# Patient Record
Sex: MALE | Race: OTHER | Employment: FULL TIME | URBAN - NONMETROPOLITAN AREA
[De-identification: names, ages, dates, MRNs, and addresses within clinical notes are randomized per-mention and may not be internally consistent; named-entity substitution may affect disease eponyms.]

---

## 2021-10-27 ENCOUNTER — LAB ENCOUNTER (OUTPATIENT)
Dept: LAB | Age: 26
End: 2021-10-27
Attending: FAMILY MEDICINE

## 2021-10-27 ENCOUNTER — OFFICE VISIT (OUTPATIENT)
Dept: FAMILY MEDICINE CLINIC | Facility: CLINIC | Age: 26
End: 2021-10-27
Payer: COMMERCIAL

## 2021-10-27 VITALS
DIASTOLIC BLOOD PRESSURE: 70 MMHG | RESPIRATION RATE: 16 BRPM | WEIGHT: 170.38 LBS | HEIGHT: 68 IN | HEART RATE: 60 BPM | SYSTOLIC BLOOD PRESSURE: 120 MMHG | OXYGEN SATURATION: 98 % | TEMPERATURE: 98 F | BODY MASS INDEX: 25.82 KG/M2

## 2021-10-27 DIAGNOSIS — Z13.0 SCREENING, ANEMIA, DEFICIENCY, IRON: ICD-10-CM

## 2021-10-27 DIAGNOSIS — Z23 NEED FOR VACCINATION: ICD-10-CM

## 2021-10-27 DIAGNOSIS — Z13.220 SCREENING, LIPID: ICD-10-CM

## 2021-10-27 DIAGNOSIS — Z00.00 LABORATORY EXAM ORDERED AS PART OF ROUTINE GENERAL MEDICAL EXAMINATION: ICD-10-CM

## 2021-10-27 DIAGNOSIS — L70.0 ACNE VULGARIS: ICD-10-CM

## 2021-10-27 DIAGNOSIS — R59.9 REACTIVE LYMPHADENOPATHY: ICD-10-CM

## 2021-10-27 DIAGNOSIS — Z00.00 PREVENTATIVE HEALTH CARE: Primary | ICD-10-CM

## 2021-10-27 DIAGNOSIS — Z13.29 SCREENING FOR THYROID DISORDER: ICD-10-CM

## 2021-10-27 PROCEDURE — 90471 IMMUNIZATION ADMIN: CPT | Performed by: FAMILY MEDICINE

## 2021-10-27 PROCEDURE — 3078F DIAST BP <80 MM HG: CPT | Performed by: FAMILY MEDICINE

## 2021-10-27 PROCEDURE — 3008F BODY MASS INDEX DOCD: CPT | Performed by: FAMILY MEDICINE

## 2021-10-27 PROCEDURE — 3074F SYST BP LT 130 MM HG: CPT | Performed by: FAMILY MEDICINE

## 2021-10-27 PROCEDURE — 99385 PREV VISIT NEW AGE 18-39: CPT | Performed by: FAMILY MEDICINE

## 2021-10-27 PROCEDURE — 90686 IIV4 VACC NO PRSV 0.5 ML IM: CPT | Performed by: FAMILY MEDICINE

## 2021-10-27 PROCEDURE — 80050 GENERAL HEALTH PANEL: CPT | Performed by: FAMILY MEDICINE

## 2021-10-27 PROCEDURE — 83516 IMMUNOASSAY NONANTIBODY: CPT | Performed by: FAMILY MEDICINE

## 2021-10-27 PROCEDURE — 80061 LIPID PANEL: CPT | Performed by: FAMILY MEDICINE

## 2021-10-27 RX ORDER — MINOCYCLINE HYDROCHLORIDE 100 MG/1
100 CAPSULE ORAL
Qty: 30 CAPSULE | Refills: 0 | Status: SHIPPED | OUTPATIENT
Start: 2021-10-27 | End: 2021-11-23

## 2021-10-27 NOTE — H&P
Lemuel Coley is a 27year old male Patient presents with:  Physical  Establish Care    HPI:   Recent acne on face 6 weeks, never had acne before, patient denies any change in his diet.   He denies any use of any supplements beyond whey powder  Right sided ly /70   Pulse 60   Temp 98.1 °F (36.7 °C) (Tympanic)   Resp 16   Ht 5' 8\" (1.727 m)   Wt 170 lb 6 oz (77.3 kg)   SpO2 98%   BMI 25.91 kg/m²   Body mass index is 25.91 kg/m².    GENERAL: well developed, well nourished,in no apparent distress  SKIN: no minocycline 100 MG Oral Cap 30 capsule 0     Sig: Take 1 capsule (100 mg total) by mouth every morning before breakfast.     Imaging & Consults:  FLULAVAL INFLUENZA VACCINE QUAD PRESERVATIVE FREE 0.5 ML  No follow-ups on file. Patient Instructions   1.  Pr

## 2021-10-27 NOTE — PATIENT INSTRUCTIONS
1. Preventative health care  I reviewed age-appropriate preventative health screening exams. I discussed age-appropriate anticipatory guidance. Discussed avoiding risk-taking behaviors. Reviewed age-appropriate immunizations. Flu vaccine provided    2.

## 2021-11-24 RX ORDER — MINOCYCLINE HYDROCHLORIDE 100 MG/1
100 CAPSULE ORAL
Qty: 90 CAPSULE | Refills: 1 | Status: SHIPPED | OUTPATIENT
Start: 2021-11-24

## 2021-11-24 NOTE — TELEPHONE ENCOUNTER
LOV 10/27/2021      LAST RX 10/27/2021 30 capsules 0 refills    Next OV No future appointments.     PROTOCOL none

## 2022-12-21 ENCOUNTER — LABORATORY ENCOUNTER (OUTPATIENT)
Dept: LAB | Age: 27
End: 2022-12-21
Attending: FAMILY MEDICINE
Payer: COMMERCIAL

## 2022-12-21 ENCOUNTER — OFFICE VISIT (OUTPATIENT)
Dept: FAMILY MEDICINE CLINIC | Facility: CLINIC | Age: 27
End: 2022-12-21
Payer: COMMERCIAL

## 2022-12-21 VITALS
OXYGEN SATURATION: 99 % | TEMPERATURE: 98 F | HEIGHT: 68 IN | DIASTOLIC BLOOD PRESSURE: 68 MMHG | SYSTOLIC BLOOD PRESSURE: 122 MMHG | RESPIRATION RATE: 16 BRPM | HEART RATE: 67 BPM | WEIGHT: 174.38 LBS | BODY MASS INDEX: 26.43 KG/M2

## 2022-12-21 DIAGNOSIS — Z00.00 PREVENTATIVE HEALTH CARE: Primary | ICD-10-CM

## 2022-12-21 DIAGNOSIS — Z13.1 SCREENING FOR DIABETES MELLITUS: ICD-10-CM

## 2022-12-21 DIAGNOSIS — Z13.220 SCREENING, LIPID: ICD-10-CM

## 2022-12-21 DIAGNOSIS — Z00.00 LABORATORY EXAM ORDERED AS PART OF ROUTINE GENERAL MEDICAL EXAMINATION: ICD-10-CM

## 2022-12-21 DIAGNOSIS — Z23 NEED FOR VACCINATION: ICD-10-CM

## 2022-12-21 DIAGNOSIS — R35.0 URINARY FREQUENCY: ICD-10-CM

## 2022-12-21 DIAGNOSIS — H53.19 VISUAL DISTORTIONS: ICD-10-CM

## 2022-12-21 LAB
ANION GAP SERPL CALC-SCNC: 3 MMOL/L (ref 0–18)
BUN BLD-MCNC: 13 MG/DL (ref 7–18)
CALCIUM BLD-MCNC: 10 MG/DL (ref 8.5–10.1)
CHLORIDE SERPL-SCNC: 103 MMOL/L (ref 98–112)
CHOLEST SERPL-MCNC: 200 MG/DL (ref ?–200)
CO2 SERPL-SCNC: 31 MMOL/L (ref 21–32)
CREAT BLD-MCNC: 1.1 MG/DL
EST. AVERAGE GLUCOSE BLD GHB EST-MCNC: 103 MG/DL (ref 68–126)
FASTING PATIENT LIPID ANSWER: YES
FASTING STATUS PATIENT QL REPORTED: YES
GFR SERPLBLD BASED ON 1.73 SQ M-ARVRAT: 93 ML/MIN/1.73M2 (ref 60–?)
GLUCOSE BLD-MCNC: 100 MG/DL (ref 70–99)
HBA1C MFR BLD: 5.2 % (ref ?–5.7)
HDLC SERPL-MCNC: 75 MG/DL (ref 40–59)
LDLC SERPL CALC-MCNC: 110 MG/DL (ref ?–100)
NONHDLC SERPL-MCNC: 125 MG/DL (ref ?–130)
OSMOLALITY SERPL CALC.SUM OF ELEC: 284 MOSM/KG (ref 275–295)
POTASSIUM SERPL-SCNC: 4.2 MMOL/L (ref 3.5–5.1)
SODIUM SERPL-SCNC: 137 MMOL/L (ref 136–145)
TRIGL SERPL-MCNC: 84 MG/DL (ref 30–149)
VLDLC SERPL CALC-MCNC: 14 MG/DL (ref 0–30)

## 2022-12-21 PROCEDURE — 83036 HEMOGLOBIN GLYCOSYLATED A1C: CPT | Performed by: FAMILY MEDICINE

## 2022-12-21 PROCEDURE — 80061 LIPID PANEL: CPT | Performed by: FAMILY MEDICINE

## 2022-12-21 PROCEDURE — 80048 BASIC METABOLIC PNL TOTAL CA: CPT | Performed by: FAMILY MEDICINE

## 2022-12-21 PROCEDURE — 3078F DIAST BP <80 MM HG: CPT | Performed by: FAMILY MEDICINE

## 2022-12-21 PROCEDURE — 3074F SYST BP LT 130 MM HG: CPT | Performed by: FAMILY MEDICINE

## 2022-12-21 PROCEDURE — 99395 PREV VISIT EST AGE 18-39: CPT | Performed by: FAMILY MEDICINE

## 2022-12-21 PROCEDURE — 90471 IMMUNIZATION ADMIN: CPT | Performed by: FAMILY MEDICINE

## 2022-12-21 PROCEDURE — 90686 IIV4 VACC NO PRSV 0.5 ML IM: CPT | Performed by: FAMILY MEDICINE

## 2022-12-21 PROCEDURE — 3008F BODY MASS INDEX DOCD: CPT | Performed by: FAMILY MEDICINE

## 2022-12-21 NOTE — PATIENT INSTRUCTIONS
I reviewed age-appropriate preventative health screening exams as well as immunizations.   Flu vaccine given  Will rule out diabetes with A1c, check lipids and BMP  Patient strongly encouraged to schedule a full eye exam  Discussed stress management strategies  Discussed finding balance between family, job, relationships etc.

## 2023-10-11 ENCOUNTER — IMMUNIZATION (OUTPATIENT)
Dept: FAMILY MEDICINE CLINIC | Facility: CLINIC | Age: 28
End: 2023-10-11
Payer: COMMERCIAL

## 2023-10-11 DIAGNOSIS — Z23 NEEDS FLU SHOT: Primary | ICD-10-CM

## 2023-10-11 PROCEDURE — 90471 IMMUNIZATION ADMIN: CPT | Performed by: PHYSICIAN ASSISTANT

## 2023-10-11 PROCEDURE — 90686 IIV4 VACC NO PRSV 0.5 ML IM: CPT | Performed by: PHYSICIAN ASSISTANT

## 2024-08-14 ENCOUNTER — OFFICE VISIT (OUTPATIENT)
Dept: FAMILY MEDICINE CLINIC | Facility: CLINIC | Age: 29
End: 2024-08-14
Payer: COMMERCIAL

## 2024-08-14 ENCOUNTER — LABORATORY ENCOUNTER (OUTPATIENT)
Dept: LAB | Age: 29
End: 2024-08-14
Attending: FAMILY MEDICINE
Payer: COMMERCIAL

## 2024-08-14 VITALS
TEMPERATURE: 98 F | HEIGHT: 68.25 IN | HEART RATE: 66 BPM | WEIGHT: 181 LBS | BODY MASS INDEX: 27.43 KG/M2 | SYSTOLIC BLOOD PRESSURE: 120 MMHG | DIASTOLIC BLOOD PRESSURE: 70 MMHG

## 2024-08-14 DIAGNOSIS — Z13.1 SCREENING FOR DIABETES MELLITUS: ICD-10-CM

## 2024-08-14 DIAGNOSIS — E78.89 ELEVATED HDL: ICD-10-CM

## 2024-08-14 DIAGNOSIS — Z00.00 PREVENTATIVE HEALTH CARE: ICD-10-CM

## 2024-08-14 DIAGNOSIS — Z00.00 PREVENTATIVE HEALTH CARE: Primary | ICD-10-CM

## 2024-08-14 DIAGNOSIS — Z13.0 SCREENING, ANEMIA, DEFICIENCY, IRON: ICD-10-CM

## 2024-08-14 LAB
ALBUMIN SERPL-MCNC: 4.9 G/DL (ref 3.2–4.8)
ALBUMIN/GLOB SERPL: 1.8 {RATIO} (ref 1–2)
ALP LIVER SERPL-CCNC: 61 U/L
ALT SERPL-CCNC: 25 U/L
ANION GAP SERPL CALC-SCNC: 2 MMOL/L (ref 0–18)
AST SERPL-CCNC: 26 U/L (ref ?–34)
BASOPHILS # BLD AUTO: 0.03 X10(3) UL (ref 0–0.2)
BASOPHILS NFR BLD AUTO: 0.5 %
BILIRUB SERPL-MCNC: 0.6 MG/DL (ref 0.3–1.2)
BUN BLD-MCNC: 18 MG/DL (ref 9–23)
CALCIUM BLD-MCNC: 10.3 MG/DL (ref 8.7–10.4)
CHLORIDE SERPL-SCNC: 103 MMOL/L (ref 98–112)
CO2 SERPL-SCNC: 31 MMOL/L (ref 21–32)
CREAT BLD-MCNC: 1.22 MG/DL
EGFRCR SERPLBLD CKD-EPI 2021: 81 ML/MIN/1.73M2 (ref 60–?)
EOSINOPHIL # BLD AUTO: 0.09 X10(3) UL (ref 0–0.7)
EOSINOPHIL NFR BLD AUTO: 1.6 %
ERYTHROCYTE [DISTWIDTH] IN BLOOD BY AUTOMATED COUNT: 11.6 %
EST. AVERAGE GLUCOSE BLD GHB EST-MCNC: 111 MG/DL (ref 68–126)
FASTING STATUS PATIENT QL REPORTED: YES
GLOBULIN PLAS-MCNC: 2.7 G/DL (ref 2–3.5)
GLUCOSE BLD-MCNC: 92 MG/DL (ref 70–99)
HBA1C MFR BLD: 5.5 % (ref ?–5.7)
HCT VFR BLD AUTO: 43.3 %
HGB BLD-MCNC: 15.1 G/DL
IMM GRANULOCYTES # BLD AUTO: 0.01 X10(3) UL (ref 0–1)
IMM GRANULOCYTES NFR BLD: 0.2 %
LYMPHOCYTES # BLD AUTO: 1.97 X10(3) UL (ref 1–4)
LYMPHOCYTES NFR BLD AUTO: 35.5 %
MCH RBC QN AUTO: 31.2 PG (ref 26–34)
MCHC RBC AUTO-ENTMCNC: 34.9 G/DL (ref 31–37)
MCV RBC AUTO: 89.5 FL
MONOCYTES # BLD AUTO: 0.56 X10(3) UL (ref 0.1–1)
MONOCYTES NFR BLD AUTO: 10.1 %
NEUTROPHILS # BLD AUTO: 2.89 X10 (3) UL (ref 1.5–7.7)
NEUTROPHILS # BLD AUTO: 2.89 X10(3) UL (ref 1.5–7.7)
NEUTROPHILS NFR BLD AUTO: 52.1 %
OSMOLALITY SERPL CALC.SUM OF ELEC: 284 MOSM/KG (ref 275–295)
PLATELET # BLD AUTO: 245 10(3)UL (ref 150–450)
POTASSIUM SERPL-SCNC: 3.8 MMOL/L (ref 3.5–5.1)
PROT SERPL-MCNC: 7.6 G/DL (ref 5.7–8.2)
RBC # BLD AUTO: 4.84 X10(6)UL
SODIUM SERPL-SCNC: 136 MMOL/L (ref 136–145)
WBC # BLD AUTO: 5.6 X10(3) UL (ref 4–11)

## 2024-08-14 PROCEDURE — 99395 PREV VISIT EST AGE 18-39: CPT | Performed by: FAMILY MEDICINE

## 2024-08-14 PROCEDURE — 83036 HEMOGLOBIN GLYCOSYLATED A1C: CPT | Performed by: FAMILY MEDICINE

## 2024-08-14 PROCEDURE — 80053 COMPREHEN METABOLIC PANEL: CPT | Performed by: FAMILY MEDICINE

## 2024-08-14 PROCEDURE — 3078F DIAST BP <80 MM HG: CPT | Performed by: FAMILY MEDICINE

## 2024-08-14 PROCEDURE — 85025 COMPLETE CBC W/AUTO DIFF WBC: CPT | Performed by: FAMILY MEDICINE

## 2024-08-14 PROCEDURE — 3074F SYST BP LT 130 MM HG: CPT | Performed by: FAMILY MEDICINE

## 2024-08-14 PROCEDURE — 3008F BODY MASS INDEX DOCD: CPT | Performed by: FAMILY MEDICINE

## 2024-08-14 NOTE — H&P
Germain Llaons is a 29 year old male who presents for a complete physical exam.   HPI:   Pt voices concerns of any age related cancers, etc.    Wt Readings from Last 6 Encounters:   08/14/24 181 lb (82.1 kg)   12/21/22 174 lb 6 oz (79.1 kg)   10/27/21 170 lb 6 oz (77.3 kg)     Body mass index is 27.32 kg/m².     Cholesterol, Total (mg/dL)   Date Value   12/21/2022 200 (H)   10/27/2021 196     HDL Cholesterol (mg/dL)   Date Value   12/21/2022 75 (H)   10/27/2021 69 (H)     LDL Cholesterol (mg/dL)   Date Value   12/21/2022 110 (H)   10/27/2021 117 (H)     AST (U/L)   Date Value   10/27/2021 25     ALT (U/L)   Date Value   10/27/2021 35      Current Outpatient Medications   Medication Sig Dispense Refill    Multiple Vitamin (MULTI-DAY OR) Take by mouth.      Glucosamine-Chondroitin (GLUCOSAMINE CHONDR COMPLEX OR) Take by mouth.       No Known Allergies     Past Medical History:    COVID-19 virus infection      Past Surgical History:   Procedure Laterality Date    Other surgical history      wisdom teeth      Family History   Problem Relation Age of Onset    No Known Problems Father     No Known Problems Mother     No Known Problems Sister     No Known Problems Brother     No Known Problems Brother       Social History:  Social History     Socioeconomic History    Marital status:    Tobacco Use    Smoking status: Never    Smokeless tobacco: Never   Substance and Sexual Activity    Alcohol use: Not Currently     Comment: weekends occ    Drug use: Never   Other Topics Concern    Caffeine Concern Yes     Comment: 1-2 cups of coffee    Exercise Yes    Special Diet No      Patient was college wrestler at [employer]  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  . Children: 4 yr old son. Month old daughter,    Exercise: 3 times per week.  Diet: watches minimally     REVIEW OF SYSTEMS:   GENERAL: generally feels well, no fatigue, denies excessive daytime drowsiness, good appetite  SKIN: denies any  unusual skin lesions or rashes  EYES:denies blurred vision or double vision, glasses/ contacts: no  ENT: denies nasal congestion, PND or ST, denies snoring and reported periods of apnea, denies hearing deficits  LUNGS: denies shortness of breath with exertion, no coughing or wheezing  CARDIOVASCULAR: denies chest pain on exertion, palpations, anginal equivalent symptoms, no claudication , no peripheral edema  GI: denies abdominal pain,denies heartburn, constipation, diarrhea, or change in bowel habits  : denies dysuria, hesitancy,nocturia, decreased urine stream, incontinence, erectile dysfunction, decreased libido   MUSCULOSKELETAL: denies back or joint pain  NEURO: denies headaches, tremors, dizziness, numbness and weakness  PSYCHE: denies depression or anxiety, denies any sleep difficulty,   HEMATOLOGIC: denies unexplained bruising or bleeding  ENDOCRINE: denies heat or cold intolerance , no unexplained wt loss or gain  EXAM:   /70   Pulse 66   Temp 97.8 °F (36.6 °C) (Temporal)   Ht 5' 8.25\" (1.734 m)   Wt 181 lb (82.1 kg)   BMI 27.32 kg/m²   Body mass index is 27.32 kg/m².   GENERAL: well developed, well nourished,in no apparent distress  SKIN: no rashes,no suspicious lesions  ENT: deformity right pinna,\" cauliflower ear\", EAC:  Clear, TMs: intact, Nose: turbinates normal, septum: midline, discharge: none, Pharynx: class 1 airway, no oral lesions  EYES:PERRLA, EOMI, normal optic disk,conjunctiva are clear  NECK: supple,no bruits, no thyromegaly  LUNGS: clear to auscultation, no w/r/r  CARDIO: RRR without murmur, no S3, S4, strong peripheral pulses, no peripheral edema  GI: +NBS's,no masses, HSM or tenderness  : two descended testes,no masses,no hernia,no penile lesions, uncircumcised penis without phimosis or paraphimosis  BACK:  : FROM, No lateral curves, Flexion:  Fingers to floor   EXTREMITIES: no cyanosis, clubbing or edema, FROM of all joints tested  NEURO: A &O X 3,cranial nerves are  intact,motor and sensory are grossly intact, DTRs +2/4 UE/LE bilaterally  PSYCH: affect: bright, speech: clear and coherent, Insight: appropriate  ASSESSMENT AND PLAN:   Germain Llanos is a 29 year old male   Encounter Diagnoses   Name Primary?    Preventative health care Yes    Elevated HDL     Screening, anemia, deficiency, iron     Screening for diabetes mellitus      Orders Placed This Encounter   Procedures    Hemoglobin A1C [E]    CBC W Differential W Platelet [E]    Comp Metabolic Panel (14) [E]     Meds & Refills for this Visit:  Requested Prescriptions      No prescriptions requested or ordered in this encounter     Imaging & Consults:  None  No follow-ups on file.  Patient Instructions   1. Preventative health care  I reviewed age-appropriate preventative health screening exams as well as immunizations.  Discussed age-appropriate anticipatory guidance.  - Hemoglobin A1C [E]; Future  - CBC W Differential W Platelet [E]; Future  - Comp Metabolic Panel (14) [E]; Future    2. Elevated HDL  Reviewed goals for lipids as well as results of most recent lipid panel, repeat 1 to 2 years    3. Screening, anemia, deficiency, iron  Check CBC  - CBC W Differential W Platelet [E]; Future    4. Screening for diabetes mellitus  Check A1c  - Hemoglobin A1C [E]; Future    Mike Monae DO, FAAFP

## 2024-08-14 NOTE — PATIENT INSTRUCTIONS
1. Preventative health care  I reviewed age-appropriate preventative health screening exams as well as immunizations.  Discussed age-appropriate anticipatory guidance.  - Hemoglobin A1C [E]; Future  - CBC W Differential W Platelet [E]; Future  - Comp Metabolic Panel (14) [E]; Future    2. Elevated HDL  Reviewed goals for lipids as well as results of most recent lipid panel, repeat 1 to 2 years    3. Screening, anemia, deficiency, iron  Check CBC  - CBC W Differential W Platelet [E]; Future    4. Screening for diabetes mellitus  Check A1c  - Hemoglobin A1C [E]; Future